# Patient Record
Sex: FEMALE | Race: BLACK OR AFRICAN AMERICAN | NOT HISPANIC OR LATINO | URBAN - METROPOLITAN AREA
[De-identification: names, ages, dates, MRNs, and addresses within clinical notes are randomized per-mention and may not be internally consistent; named-entity substitution may affect disease eponyms.]

---

## 2020-08-24 ENCOUNTER — INPATIENT (INPATIENT)
Facility: HOSPITAL | Age: 40
LOS: 0 days | Discharge: HOME | DRG: 812 | End: 2020-08-25
Attending: HOSPITALIST | Admitting: INTERNAL MEDICINE
Payer: COMMERCIAL

## 2020-08-24 VITALS
WEIGHT: 197.98 LBS | RESPIRATION RATE: 17 BRPM | HEART RATE: 87 BPM | OXYGEN SATURATION: 96 % | TEMPERATURE: 99 F | SYSTOLIC BLOOD PRESSURE: 122 MMHG | DIASTOLIC BLOOD PRESSURE: 87 MMHG

## 2020-08-24 DIAGNOSIS — Z98.890 OTHER SPECIFIED POSTPROCEDURAL STATES: Chronic | ICD-10-CM

## 2020-08-24 DIAGNOSIS — D47.3 ESSENTIAL (HEMORRHAGIC) THROMBOCYTHEMIA: ICD-10-CM

## 2020-08-24 DIAGNOSIS — R63.8 OTHER SYMPTOMS AND SIGNS CONCERNING FOOD AND FLUID INTAKE: ICD-10-CM

## 2020-08-24 DIAGNOSIS — D50.9 IRON DEFICIENCY ANEMIA, UNSPECIFIED: ICD-10-CM

## 2020-08-24 DIAGNOSIS — D25.9 LEIOMYOMA OF UTERUS, UNSPECIFIED: ICD-10-CM

## 2020-08-24 DIAGNOSIS — N83.201 UNSPECIFIED OVARIAN CYST, RIGHT SIDE: ICD-10-CM

## 2020-08-24 LAB
SARS-COV-2 RNA SPEC QL NAA+PROBE: SIGNIFICANT CHANGE UP
TRANSFERRIN SERPL-MCNC: 382 MG/DL — HIGH (ref 200–360)

## 2020-08-24 PROCEDURE — 76830 TRANSVAGINAL US NON-OB: CPT | Mod: 26

## 2020-08-24 PROCEDURE — 76856 US EXAM PELVIC COMPLETE: CPT | Mod: 26

## 2020-08-24 PROCEDURE — 99285 EMERGENCY DEPT VISIT HI MDM: CPT

## 2020-08-24 PROCEDURE — 99223 1ST HOSP IP/OBS HIGH 75: CPT | Mod: GC

## 2020-08-24 RX ORDER — LANOLIN ALCOHOL/MO/W.PET/CERES
5 CREAM (GRAM) TOPICAL AT BEDTIME
Refills: 0 | Status: DISCONTINUED | OUTPATIENT
Start: 2020-08-24 | End: 2020-08-25

## 2020-08-24 RX ADMIN — Medication 5 MILLIGRAM(S): at 23:31

## 2020-08-24 NOTE — H&P ADULT - HISTORY OF PRESENT ILLNESS
Patient is a 40yo female w/ PMH uterine fibroids who was sent to ED by hematologist Dr. Andrade after an office visit today that revealed Hb of 5 on labs. Patient saw gynecologist at [facility] office last week for her uterine fibroids with discussion of planning for a myomectomy. At this visit, she had blood drawn which showed Hb of 4 for which she was referred to see Dr. Andrade at an appointment earlier today with repeat labs done prior to being sent to ED. Patient reports at baseline she is physically active but for the past 4 years she has noticed herself feeling weaker and lightheaded and experiencing shortness of breath on exertion. Her menses have become heavier, particularly during the first 2 days of her cycle. Otherwise, patient has not noticed any other symptoms. No recent illnesses, no fevers, chills, CP, abdominal pain, N/V/D, or vaginal bleeding. Of note, she has not seen a PCP in at least 6 years.    ED Vitals: 97.8 degrees, 68bpm, 110/74, RR18, 100% RA  ED Labs: Hb 5.4, WBC 7.26, Plt 928, PT 13.4, INR 1.12, PTT 30.2  ED Imaging: Pelvic US pending  ED Course: T&S x2 done, given 1U pRBC. Seen by GynOnc who recommended TVUS but patient declined so pelvic US being done. No vaginal bleeding so GynOnc says nothing to do on their end but they will continue to follow. Patient is a 38yo female w/ PMH uterine fibroids who was sent to ED by hematologist Dr. Andrade after an office visit today that revealed Hb of 5 on labs. Patient saw gynecologist at [facility] office last week for her uterine fibroids with discussion of planning for a myomectomy. At this visit, she had blood drawn which showed Hb of 4 for which she was referred to see Dr. Andrade at an appointment earlier today with repeat labs done prior to being sent to ED. Patient reports at baseline she is physically active but for the past 4 years she has noticed herself feeling weaker and lightheaded and experiencing shortness of breath on exertion. Her menses have become heavier, particularly during the first 2 days of her cycle. Otherwise, patient has not noticed any other symptoms. No recent illnesses, no recent injuries/trauma, no fevers, chills, CP, abdominal pain, N/V/D, or vaginal bleeding. Of note, she has not seen a PCP in at least 6 years.    ED Vitals: 97.8 degrees, 68bpm, 110/74, RR18, 100% RA  ED Labs: Hb 5.4, WBC 7.26, Plt 928, PT 13.4, INR 1.12, PTT 30.2  ED Imaging: Pelvic US pending  ED Course: T&S x2 done, given 1U pRBC. Seen by GynOnc who recommended TVUS but patient declined so pelvic US being done. No vaginal bleeding so GynOnc says nothing to do on their end but they will continue to follow. Patient is a 38yo female w/ PMH uterine fibroids who was sent to ED by hematologist Dr. Andrade after an office visit today that revealed Hb of 5 on labs. Patient saw gynecologist at [facility] office last week for her uterine fibroids with discussion of planning for a myomectomy. At this visit, she had blood drawn which showed Hb of 4.9 for which she was referred to see Dr. Andrade at an appointment earlier today with repeat labs done prior to being sent to ED. Labs in Dr. Andrade's office were notable for Hb 5.1 and plt 1024. Patient reports at baseline she is physically active but for the past 4 years she has noticed herself feeling increasingly weaker and lightheaded and experiencing shortness of breath during her workouts. She subsequently decreased her time spent working out. Patient also reports the past 1 year her menses have become heavier, particularly during the first 2 days of her cycle. No increased days of bleeding or increased pain with menses though. Patient says her symptoms of weakness and lightheadedness significantly worsened after she got COVID19 in 3/2020. She recovered from COVID without any complications but since then, patient reports she has become increasingly lethargic and low energy during the day. She is unable to even walk her dog and can only manage to walk about 2 blocks before she starts feeling tired and needing to stop. Denies any syncope since onset of symptoms but reports she almost passed out 2 weeks ago at home. She started feeling dizzy and went to her bedroom to lay down and the symptoms resolved after a few minutes. Reports also developing intermittent throbbing headaches for the past 2 weeks associated with activity. Bending over will notably trigger the headache and she experiences them every day now. She tried taking ibuprofen a few times for the headaches but says this did not help at all. No recent illnesses, no recent injuries/trauma, no fevers, chills, CP, abdominal pain, N/V/D, or vaginal bleeding. Is pescetarian and reports hydrating well during the day. Takes B12, VitC, VitD, and Chelo supplements consistently at home. Of note, she has not seen a PCP in at least 6 years.    ED Vitals: 97.8 degrees, 68bpm, 110/74, RR18, 100% RA  ED Labs: Hb 5.4, WBC 7.26, Plt 928, PT 13.4, INR 1.12, PTT 30.2  ED Imaging: Pelvic US pending  ED Course: T&S x2 done, given 1U pRBC. Seen by GynOnc who recommended TVUS but patient declined so pelvic US being done. No vaginal bleeding so GynOnc says nothing to do on their end but they will continue to follow. Patient is a 40yo female w/ PMH uterine fibroids who was sent to ED by hematologist Dr. Andrade after an office visit today that revealed Hb of 5 on labs. Patient saw gynecologist at [facility] office last week for her uterine fibroids with discussion of planning for a myomectomy. At this visit, she had blood drawn which showed Hb of 4.9 for which she was referred to see Dr. Andrade at an appointment earlier today with repeat labs done prior to being sent to ED. Labs in Dr. Andrade's office were notable for Hb 5.1 and plt 1024. Patient reports at baseline she is physically active but for the past 4 years she has noticed herself feeling increasingly weaker and lightheaded and experiencing shortness of breath during her workouts. She subsequently decreased her time spent working out. Patient also reports the past 1 year her menses have become heavier, particularly during the first 2 days of her cycle. No increased days of bleeding or increased pain with menses though. Patient says her symptoms of weakness and lightheadedness significantly worsened after she got COVID19 in 3/2020. She recovered from COVID without any complications but since then, patient reports she has become increasingly lethargic and low energy during the day. She is unable to even walk her dog and can only manage to walk about 2 blocks before she starts feeling tired and needing to stop. Denies any syncope since onset of symptoms but reports she almost passed out 2 weeks ago at home. She started feeling dizzy and went to her bedroom to lay down and the symptoms resolved after a few minutes. Reports also developing intermittent throbbing headaches for the past 2 weeks associated with activity. Bending over will notably trigger the headache and she experiences them every day now. She tried taking ibuprofen a few times for the headaches but says this did not help at all. FH notable for sister also with hx of heavy periods and mother with prolonged menopausal bleeding. No recent illnesses, no recent injuries/trauma, no fevers, chills, CP, abdominal pain, N/V/D, or vaginal bleeding. Is pescetarian and reports hydrating well during the day. Takes B12, VitC, VitD, and Chelo supplements consistently at home. Of note, she has not seen a PCP in at least 6 years.    ED Vitals: 97.8 degrees, 68bpm, 110/74, RR18, 100% RA  ED Labs: Hb 5.4, WBC 7.26, Plt 928, PT 13.4, INR 1.12, PTT 30.2  ED Imaging: Pelvic US pending  ED Course: T&S x2 done, given 1U pRBC. Seen by GynOnc who recommended TVUS but patient declined so pelvic US being done. No vaginal bleeding so GynOnc says nothing to do on their end but they will continue to follow. Patient is a 40yo female w/ PMH uterine fibroids who was sent to ED by hematologist Dr. Andrade after an office visit today that revealed Hb of 5 on labs. Patient saw gynecologist at [facility] office last week for her uterine fibroids with discussion of planning for a myomectomy. At this visit, she had blood drawn which showed Hb of 4.9 for which she was referred to see Dr. Andrade at an appointment earlier today with repeat labs done prior to being sent to ED. Labs in Dr. Andrade's office were notable for Hb 5.1 and plt 1024. Patient reports at baseline she is physically active but for the past 4 years she has noticed herself feeling increasingly weaker and lightheaded and experiencing shortness of breath during her workouts. She subsequently decreased her time spent working out. Patient also reports the past 1 year her menses have become heavier, particularly during the first 2 days of her cycle. No increased days of bleeding or increased pain with menses though. Patient says her symptoms of weakness and lightheadedness significantly worsened after she got COVID19 in 3/2020. She recovered from COVID without any complications but since then, patient reports she has become increasingly lethargic and low energy during the day. She is unable to even walk her dog and can only manage to walk about 2 blocks before she starts feeling tired and needing to stop. Denies any syncope since onset of symptoms but reports she almost passed out 2 weeks ago at home. She started feeling dizzy and went to her bedroom to lay down and the symptoms resolved after a few minutes. Reports also developing intermittent throbbing headaches for the past 2 weeks associated with activity. Bending over will notably trigger the headache and she experiences them every day now. She tried taking ibuprofen a few times for the headaches but says this did not help at all. FH notable for sister also with hx of heavy periods and mother with prolonged menopausal bleeding. No recent illnesses, no recent injuries/trauma, no fevers, chills, CP, abdominal pain, N/V/D, or vaginal bleeding. Is pescetarian and reports hydrating well during the day. Takes B12, VitC, VitD, and Chelo supplements consistently at home. Of note, she has not seen a PCP in at least 6 years. LMP 8/18    ED Vitals: 97.8 degrees, 68bpm, 110/74, RR18, 100% RA  ED Labs: Hb 5.4, WBC 7.26, Plt 928, PT 13.4, INR 1.12, PTT 30.2  ED Imaging: Pelvic US pending  ED Course: T&S x2 done, given 1U pRBC. Seen by Gyn who recommended TVUS but patient declined so pelvic US being done. No vaginal bleeding so Gyn says nothing to do on their end but they will continue to follow.

## 2020-08-24 NOTE — CONSULT NOTE ADULT - ASSESSMENT
38yo  LMP  with hx of fibroids presents with symptomatic anemia with hemoglobin of 5.4. Recommend TVUS but Pt is refusing so will get a Pelvic sonogram. Vitals stable. Medicine team to see Pt. Pt is not currently having any vaginal bleeding so no intervention necessary at this time. Recommend 2u PRBC to start and repeat hemoglobin after transfusion is done.  GYN to continue to follow. Pt discussed with Dr. Curtis.

## 2020-08-24 NOTE — H&P ADULT - NSHPREVIEWOFSYSTEMS_GEN_ALL_CORE
REVIEW OF SYSTEMS:    CONSTITUTIONAL: No weakness, fevers or chills  EYES/ENT: No visual changes;  No vertigo or throat pain   NECK: No pain or stiffness  RESPIRATORY: No cough, wheezing, hemoptysis; No shortness of breath  CARDIOVASCULAR: No chest pain or palpitations  GASTROINTESTINAL: No abdominal or epigastric pain. No nausea, vomiting, or hematemesis; No diarrhea or constipation. No melena or hematochezia.  GENITOURINARY: No dysuria, frequency or hematuria  NEUROLOGICAL: No numbness or weakness  SKIN: No itching, burning, rashes, or lesions   All other review of systems is negative unless indicated above. REVIEW OF SYSTEMS:    CONSTITUTIONAL: increased weakness, lightheadedness, and lethargy, endorses throbbing headache, no fevers or chills  EYES/ENT: No visual changes;  No vertigo or throat pain   NECK: No pain or stiffness  RESPIRATORY: No cough, wheezing, hemoptysis; No shortness of breath  CARDIOVASCULAR: No chest pain or palpitations  GASTROINTESTINAL: No abdominal or epigastric pain. No nausea, vomiting, or hematemesis; No diarrhea or constipation. No melena or hematochezia.  GENITOURINARY: No dysuria, frequency or hematuria  NEUROLOGICAL: No numbness or tingling in extremitis  SKIN: No itching, burning, rashes, or lesions   All other review of systems is negative unless indicated above.

## 2020-08-24 NOTE — ED ADULT NURSE NOTE - OBJECTIVE STATEMENT
PT is a 39y female sent by MD for irregular labs. Pt states hx of fibroids, usually has heavy periods x 5 days, using about 7 pads per day. PT denies any pain, denies CP/SOB, pt states she does feel weak and sleepy. PT denies hx of blood transfusion, states is not bleeding now.

## 2020-08-24 NOTE — H&P ADULT - PROBLEM SELECTOR PLAN 2
- history of uterine fibroids  - f/u w/ outpatient gyn for planned myomectomy - history of uterine fibroids  - f/u w/ outpatient gyn for planned myomectomy  - gyn following - per US findings above  - f/u w/ gyn

## 2020-08-24 NOTE — H&P ADULT - NSHPSOCIALHISTORY_GEN_ALL_CORE
drinks alcohol socially, currently not sexually active Lives by herself at home with her dog. Denies hx of smoking and recreational drug use. Drinks alcohol socially. Currently not sexually active. Travels for work but has not done so for past several months since onset of pandemic. Was very physically active prior to onset of symptoms.

## 2020-08-24 NOTE — H&P ADULT - NSHPPHYSICALEXAM_GEN_ALL_CORE
.  VITAL SIGNS:  T(C): 36.7 (08-24-20 @ 16:54), Max: 37.1 (08-24-20 @ 11:34)  T(F): 98.1 (08-24-20 @ 16:54), Max: 98.7 (08-24-20 @ 11:34)  HR: 64 (08-24-20 @ 16:54) (63 - 87)  BP: 118/80 (08-24-20 @ 16:54) (105/71 - 124/78)  BP(mean): --  RR: 17 (08-24-20 @ 16:54) (17 - 20)  SpO2: 100% (08-24-20 @ 16:54) (96% - 100%)  Wt(kg): --    PHYSICAL EXAM:    Constitutional: WDWN resting comfortably in bed; NAD  Head: NC/AT  Eyes: PERRL, EOMI, clear conjunctiva  ENT: no nasal discharge; uvula midline, no oropharyngeal erythema or exudates; MMM  Neck: supple; no JVD or thyromegaly  Respiratory: CTA B/L; no W/R/R, no retractions  Cardiac: +S1/S2; RRR; no M/R/G; PMI non-displaced  Gastrointestinal: soft, NT/ND; no rebound or guarding; +BSx4  Genitourinary: normal external genitalia  Back: spine midline, no bony tenderness or step-offs; no CVAT B/L  Extremities: WWP, no clubbing or cyanosis; no peripheral edema  Musculoskeletal: NROM x4; no joint swelling, tenderness or erythema  Vascular: 2+ radial, femoral, DP/PT pulses B/L  Dermatologic: skin warm, dry and intact; no rashes, wounds, or scars  Lymphatic: no submandibular or cervical LAD  Neurologic: AAOx3; CNII-XII grossly intact; no focal deficits  Psychiatric: affect and characteristics of appearance, verbalizations, behaviors are appropriate .  VITAL SIGNS:  T(C): 36.7 (08-24-20 @ 16:54), Max: 37.1 (08-24-20 @ 11:34)  T(F): 98.1 (08-24-20 @ 16:54), Max: 98.7 (08-24-20 @ 11:34)  HR: 64 (08-24-20 @ 16:54) (63 - 87)  BP: 118/80 (08-24-20 @ 16:54) (105/71 - 124/78)  BP(mean): --  RR: 17 (08-24-20 @ 16:54) (17 - 20)  SpO2: 100% (08-24-20 @ 16:54) (96% - 100%)  Wt(kg): --    PHYSICAL EXAM:    Constitutional: WDWN resting comfortably in bed; NAD  Head: NC/AT  Eyes: PERRL, EOMI, clear conjunctiva, sclera nonicteric  ENT: no nasal discharge; uvula midline, no oropharyngeal erythema or exudates, mild paleness in tongue noted; MMM  Neck: supple; no JVD or thyromegaly  Respiratory: CTA B/L; no W/R/R, no retractions  Cardiac: +S1/S2; RRR; no M/R/G  Gastrointestinal: soft, NT/ND; no rebound or guarding; +BSx4  Extremities: WWP, no clubbing or cyanosis; no peripheral edema  Musculoskeletal: NROM x4; no joint swelling, tenderness or erythema  Vascular: 2+ radial and DP pulses B/L  Dermatologic: skin warm, dry and intact; no rashes, wounds, or scars  Lymphatic: no submandibular or cervical LAD  Neurologic: AAOx3; CNII-XII grossly intact; no focal deficits  Psychiatric: affect and characteristics of appearance, verbalizations, behaviors are appropriate

## 2020-08-24 NOTE — H&P ADULT - ASSESSMENT
38yo female w/ PMH uterine fibroids is admitted for low hemoglobin found in outpatient without any signs of bleeding. Now s/p 1U pRBC 38yo female w/ PMH uterine fibroids is admitted for low hemoglobin found in outpatient without any overt signs of bleeding. Now s/p 1U pRBC

## 2020-08-24 NOTE — ED ADULT NURSE NOTE - CHPI ED NUR SYMPTOMS NEG
no back pain/no fever/no pain/no discharge/no abdominal pain/no vomiting/no coffee grounds emesis/no nausea

## 2020-08-24 NOTE — CONSULT NOTE ADULT - SUBJECTIVE AND OBJECTIVE BOX
38yo  LMP  with hx of fibroids was sent from hematologist's office for hemoglobin of 5. Pt states she has not seen a doctor in 6 years and recently just saw a gynecologist last week.  Pt had blood drawn last week in GYN office at [facility] which revealed a hemoglobin of 4 and then was referred to see a hematologist, Dr. Andrade, today for anemia. Plan for fibroids was discuss with her GYN and they planned on a myomectomy. Pt is relatively an active person but in the last 4 years has been feeling weak and have been experiencing SOB with exertion. Pt's menses comes at the same time every month but bleeding has become heavier. She states she "gushes a lot of blood" in the first two days of her menses requiring the use of eight maxi pads and tampons. Pt does not desire to ever be on hormonal therapy. She is currently not sexually active.    Pt denies fever, chills, chest pain, SOB, abdominal pain, nausea, vomiting, or vaginal bleeding    OB/GYN Hx:    VTOP x2- manual vaccuum aspiration x1              -D&C x1   Last PAP smear: 6 years ago-normal   PMHx: denies   SHx: D&C x1   Meds: none   Allergies: NKDA     Social hx: recreational alcohol     PHYSICAL EXAM:   Vital Signs Last 24 Hrs  T(C): 36.6 (24 Aug 2020 13:36), Max: 37.1 (24 Aug 2020 11:34)  T(F): 97.8 (24 Aug 2020 13:36), Max: 98.7 (24 Aug 2020 11:34)  HR: 68 (24 Aug 2020 13:36) (68 - 87)  BP: 105/71 (24 Aug 2020 13:36) (105/71 - 122/87)  BP(mean): --  RR: 18 (24 Aug 2020 13:36) (17 - 18)  SpO2: 100% (24 Aug 2020 13:36) (96% - 100%)    **************************  Constitutional: Alert & Oriented x3, No acute distress  Respiratory: Clear to ausculation bilaterally; no wheezing, rhonchi, or crackles  Cardiovascular: regular rate and rhythm, no murmurs, or gallops  Gastrointestinal: soft, nontender, positive bowel sounds, no rebound or guarding   Extremities: no calf tenderness or swelling    LABS:                        5.4    7.26  )-----------( 928      ( 24 Aug 2020 12:16 )             20.5         136  |  102  |  11  ----------------------------<  96  4.4   |  23  |  0.76    Ca    9.0      24 Aug 2020 12:16    TPro  8.0  /  Alb  4.8  /  TBili  0.3  /  DBili  x   /  AST  13  /  ALT  7<L>  /  AlkPhos  46  08-24    PT/INR - ( 24 Aug 2020 12:16 )   PT: 13.4 sec;   INR: 1.12        PTT - ( 24 Aug 2020 12:16 )  PTT:30.2 sec      RADIOLOGY & ADDITIONAL STUDIES: 40yo  LMP  with hx of fibroids was sent from hematologist's office for hemoglobin of 5. Pt states she has not seen a doctor in 6 years and recently just saw a gynecologist last week.  Pt had blood drawn last week in GYN office at [facility] which revealed a hemoglobin of 4 and then was referred to see a hematologist, Dr. Andrade, today for anemia. Plan for fibroids was discuss with her GYN and they planned on a myomectomy. Pt is relatively an active person but in the last 4 years has been feeling weak, lightheaded and have been experiencing SOB with exertion. Pt's menses comes at the same time every month but bleeding has become heavier. She states she "gushes a lot of blood" in the first two days of her menses requiring the use of eight maxi pads and tampons. Pt does not desire to ever be on hormonal therapy. She is currently not sexually active.    Pt denies fever, chills, chest pain, abdominal pain, nausea, vomiting, or vaginal bleeding    OB/GYN Hx:    VTOP x2- manual vaccuum aspiration x1              -D&C x1   Last PAP smear: 6 years ago-normal   PMHx: denies   SHx: D&C x1   Meds: none   Allergies: NKDA     Social hx: recreational alcohol     PHYSICAL EXAM:   Vital Signs Last 24 Hrs  T(C): 36.6 (24 Aug 2020 13:36), Max: 37.1 (24 Aug 2020 11:34)  T(F): 97.8 (24 Aug 2020 13:36), Max: 98.7 (24 Aug 2020 11:34)  HR: 68 (24 Aug 2020 13:36) (68 - 87)  BP: 105/71 (24 Aug 2020 13:36) (105/71 - 122/87)  BP(mean): --  RR: 18 (24 Aug 2020 13:36) (17 - 18)  SpO2: 100% (24 Aug 2020 13:36) (96% - 100%)    **************************  Constitutional: Alert & Oriented x3, No acute distress  Respiratory: Clear to ausculation bilaterally; no wheezing, rhonchi, or crackles  Cardiovascular: regular rate and rhythm, no murmurs, or gallops  Gastrointestinal: soft, nontender, positive bowel sounds, no rebound or guarding   Extremities: no calf tenderness or swelling    LABS:                        5.4    7.26  )-----------( 928      ( 24 Aug 2020 12:16 )             20.5         136  |  102  |  11  ----------------------------<  96  4.4   |  23  |  0.76    Ca    9.0      24 Aug 2020 12:16    TPro  8.0  /  Alb  4.8  /  TBili  0.3  /  DBili  x   /  AST  13  /  ALT  7<L>  /  AlkPhos  46      PT/INR - ( 24 Aug 2020 12:16 )   PT: 13.4 sec;   INR: 1.12        PTT - ( 24 Aug 2020 12:16 )  PTT:30.2 sec      RADIOLOGY & ADDITIONAL STUDIES:

## 2020-08-24 NOTE — H&P ADULT - PROBLEM SELECTOR PLAN 1
- Hb of 4 last week in outptx gyn and then Hb 5 in Dr. Andrade's office earlier today  - On admission Hb 5.4  - no reported history of dx of anemia  - s/p 1U pRBC  - maintain active T&S (last done 8/24)  - f/u AM CBC  - f/u iron studies - most likely 2/2 hemorrhagic ovarian cyst found on pelvic US vs iron deficiency anemia  - pelvic US: Right ovarian hemorrhagic cyst measuring 7.4 cm  - has been having significantly increased mentstrual bleeding at beginning of menses but no vaginal bleeding outside of menses and no abdominal pain  - has been feeling lightheaded and low energy for past year, exacerbated by COVID  - Hb of 4 last week in outptx gyn and then Hb 5 in Dr. Andrade's office earlier today  - On admission Hb 5.4  - no reported history of dx of anemia  - s/p 1U pRBC, 2nd 1U of pRBC ordered  - maintain active T&S (last done 8/24)  - f/u AM CBC  - f/u iron studies  - gyn following - most likely 2/2 hemorrhagic ovarian cyst found on pelvic US vs uterine fibroids vs iron deficiency anemia  - transvaginal and pelvic US: Right ovarian hemorrhagic cyst measuring 7.4 cm  - has been having significantly increased menstrual bleeding at beginning of menses but no vaginal bleeding outside of menses and no abdominal pain  - has been feeling lightheaded and low energy for past year, exacerbated by COVID  - Hb of 4.9 last week in outptx gyn and then Hb 5.1 in Dr. Andrade's office earlier today  - On admission Hb 5.4  - no reported history of dx of anemia  - s/p 1U pRBC, 2nd 1U of pRBC ordered  - maintain active T&S (last done 8/24)  - f/u AM CBC  - f/u iron studies  - gyn following, f/u recs - most likely 2/2 uterine fibroids causing heavier menstrual bleeding VS hemorrhagic ovarian cyst found on pelvic US vs iron deficiency anemia  - transvaginal and pelvic US: Right ovarian hemorrhagic cyst measuring 7.4 cm  - has been having significantly increased menstrual bleeding at beginning of menses but no vaginal bleeding outside of menses and no abdominal pain  - has been feeling lightheaded and low energy for past year, exacerbated by COVID  - Hb of 4.9 last week in outptx gyn and then Hb 5.1 in Dr. Andrade's office earlier today  - On admission Hb 5.4  - no reported history of dx of anemia  - s/p 1U pRBC, 2nd 1U of pRBC ordered  - maintain active T&S (last done 8/24)  - f/u AM CBC  - f/u iron studies  - f/u B12, folate, and reticulocyte count  - gyn following, f/u recs

## 2020-08-24 NOTE — ED ADULT NURSE NOTE - NSIMPLEMENTINTERV_GEN_ALL_ED
Implemented All Universal Safety Interventions:  [place] to call system. Call bell, personal items and telephone within reach. Instruct patient to call for assistance. Room bathroom lighting operational. Non-slip footwear when patient is off stretcher. Physically safe environment: no spills, clutter or unnecessary equipment. Stretcher in lowest position, wheels locked, appropriate side rails in place.

## 2020-08-24 NOTE — H&P ADULT - NSHPLABSRESULTS_GEN_ALL_CORE
.  LABS:                         5.4    7.26  )-----------( 928      ( 24 Aug 2020 12:16 )             20.5     08-24    136  |  102  |  11  ----------------------------<  96  4.4   |  23  |  0.76    Ca    9.0      24 Aug 2020 12:16    TPro  8.0  /  Alb  4.8  /  TBili  0.3  /  DBili  x   /  AST  13  /  ALT  7<L>  /  AlkPhos  46  08-24    PT/INR - ( 24 Aug 2020 12:16 )   PT: 13.4 sec;   INR: 1.12       PTT - ( 24 Aug 2020 12:16 )  PTT:30.2 sec        RADIOLOGY, EKG & ADDITIONAL TESTS: Reviewed. .  LABS:                         5.4    7.26  )-----------( 928      ( 24 Aug 2020 12:16 )             20.5     08-24    136  |  102  |  11  ----------------------------<  96  4.4   |  23  |  0.76    Ca    9.0      24 Aug 2020 12:16    TPro  8.0  /  Alb  4.8  /  TBili  0.3  /  DBili  x   /  AST  13  /  ALT  7<L>  /  AlkPhos  46  08-24    PT/INR - ( 24 Aug 2020 12:16 )   PT: 13.4 sec;   INR: 1.12       PTT - ( 24 Aug 2020 12:16 )  PTT:30.2 sec        RADIOLOGY, EKG & ADDITIONAL TESTS: Reviewed.    < from: US Pelvis Complete (08.24.20 @ 18:44) >  IMPRESSION:  1.  Right ovarian hemorrhagic cyst measuring 7.4 cm. Follow-up examination in 6-12 weeks is recommended to ensure resolution.  2.  Multiple uterine fibroids measuring up to 10 cm.

## 2020-08-24 NOTE — H&P ADULT - ATTENDING COMMENTS
39F PMHx Fibroid Uterus presenting with significant anemia on outpatient labs and thrombocytosis, likely due to menorrhagia.     1. Anemia   - Hgb 5.4 - microcytic   - Likely due to menorrhagia vs GITA  - Iron Studies ordered - gold top not obtained in ED so these will be post 2u and therefore not reliable.     2. Thrombocytosis   - Reactive vs Autonomous   - In terms of reactive processes - can be related to blood loss or iron deficiency. As above, iron studies will not be helpful after transfusion.   - Patient denies family history of elevated platelet count or hematologic malignancy.   - Can obtain a smear to further evaluate platelet morphology   - Patient reported severe headaches 2 weeks ago, could be a symptom of thrombocytosis? At present, however, she is asymptomatic   - For f/u outpatient with Dr Andrade, who is aware   - Consider alerting fellow in AM of admission     3. Fibroid Uterus and Hemorrhagic Ovarian Cyst   - Gyn aware, s/p TVUS - confirming multiple large fibroids and incidental finding of hemorrhagic ovarian cyst (for conservative management and interval outpatient imaging in 6-12 weeks).   - Continue outpatient f/u - likely plan for myomectomy, patient to undergo pelvic MRI for pre-op workup.

## 2020-08-24 NOTE — ED PROVIDER NOTE - CLINICAL SUMMARY MEDICAL DECISION MAKING FREE TEXT BOX
39 y/o F here for anemia due to menstrual bleeding secondary to fibroids, here in ED Hgb 5.4, pt not actively bleeding. OBGYN consulted, recommends US and 2 units of blood transfusion. Contacted hospitalist Dr. Tam who agreed to admit but senior house officer was unable to be reached for almost half hour so confirmed w/ administration that it is okay to just give reports to the hospitalist. 37 y/o F here for anemia due to menstrual bleeding secondary to fibroids, here in ED Hgb 5.4, pt not actively bleeding. OBGYN consulted, recommends US and 2 units of blood transfusion. Contacted hospitalist Dr. Tam who agreed to admit but senior house officer was unable to be reached for almost half hour so confirmed w/ administration that it is okay to give report to hospitalist only.

## 2020-08-24 NOTE — H&P ADULT - PROBLEM SELECTOR PLAN 3
F - none  E - replete prn  N - regular diet  A -     Full Code  Dispo RMF - most likely reactive in setting of anemia and blood loss  - plt of 1024 in Dr. Andrade's office, repeat in ED was 928  - no family history of bleeding or hematological disorders noted  - f/u above labs in AM  - consult heme in AM (Dr. Andrade is pt's outpatient hematologist) - most likely reactive in setting of anemia and blood loss  - plt of 1024 in Dr. Andrade's office, repeat in ED was 928  - no family history of hematological disorders noted  - f/u above labs in AM  - consult heme in AM (Dr. Andrade is pt's outpatient hematologist)

## 2020-08-24 NOTE — H&P ADULT - PROBLEM SELECTOR PLAN 5
F - none  E - replete prn  N - regular diet  A -     Full Code  Dispo RMF F - none  E - replete prn  N - regular diet  A - none for now in setting of low Hb/possible bleed    Full Code  Dispo RMF

## 2020-08-24 NOTE — ED ADULT TRIAGE NOTE - CHIEF COMPLAINT QUOTE
c/o heavy vaginal bleeding 5-6 pads / day x few days, weakness, lightheadedness that started today.  Was told by her PMD that her Hgb is 5.3.  Hx fibroids

## 2020-08-24 NOTE — ED PROVIDER NOTE - OBJECTIVE STATEMENT
40 y/o F w/ PMHx fibroids, has heavy menstrual bleeding sts period typically lasts for x5 days and has to change pad every hour, last period last wk now presents to the ED sent by OBGYN for blood transfusion, did blood work which showed Hgb 4.8. Pt reports some dizziness when stands up and a little generalized weakness. No syncope. Not currently bleeding. No other complaints. Scheduled for outpatient myomectomy.

## 2020-08-24 NOTE — ED PROVIDER NOTE - NEURO NEGATIVE STATEMENT, MLM
no loss of consciousness, no gait abnormality, no headache, no sensory deficits, no weakness, +dizziness

## 2020-08-25 VITALS
OXYGEN SATURATION: 98 % | HEART RATE: 77 BPM | TEMPERATURE: 98 F | SYSTOLIC BLOOD PRESSURE: 121 MMHG | DIASTOLIC BLOOD PRESSURE: 70 MMHG | RESPIRATION RATE: 18 BRPM

## 2020-08-25 LAB
FERRITIN SERPL-MCNC: 8 NG/ML — LOW (ref 15–150)
FOLATE SERPL-MCNC: 15.7 NG/ML — SIGNIFICANT CHANGE UP
HCT VFR BLD CALC: 26.6 % — LOW (ref 34.5–45)
HGB BLD-MCNC: 7.6 G/DL — LOW (ref 11.5–15.5)
IRON SATN MFR SERPL: 20 UG/DL — LOW (ref 30–160)
IRON SATN MFR SERPL: 4 % — LOW (ref 14–50)
MCHC RBC-ENTMCNC: 21.3 PG — LOW (ref 27–34)
MCHC RBC-ENTMCNC: 28.6 GM/DL — LOW (ref 32–36)
MCV RBC AUTO: 74.5 FL — LOW (ref 80–100)
NRBC # BLD: 0 /100 WBCS — SIGNIFICANT CHANGE UP (ref 0–0)
PLATELET # BLD AUTO: 997 K/UL — HIGH (ref 150–400)
RBC # BLD: 3.57 M/UL — LOW (ref 3.8–5.2)
RBC # BLD: 3.57 M/UL — LOW (ref 3.8–5.2)
RBC # FLD: 23.9 % — HIGH (ref 10.3–14.5)
RETICS #: 17.3 K/UL — LOW (ref 25–125)
RETICS/RBC NFR: 0.5 % — SIGNIFICANT CHANGE UP (ref 0.5–2.5)
SARS-COV-2 IGG SERPL QL IA: NEGATIVE — SIGNIFICANT CHANGE UP
SARS-COV-2 IGM SERPL IA-ACNC: <0.1 INDEX — SIGNIFICANT CHANGE UP
TIBC SERPL-MCNC: 446 UG/DL — HIGH (ref 220–430)
TRANSFERRIN SERPL-MCNC: 362 MG/DL — HIGH (ref 200–360)
UIBC SERPL-MCNC: 426 UG/DL — HIGH (ref 110–370)
VIT B12 SERPL-MCNC: 655 PG/ML — SIGNIFICANT CHANGE UP (ref 232–1245)
WBC # BLD: 8.74 K/UL — SIGNIFICANT CHANGE UP (ref 3.8–10.5)
WBC # FLD AUTO: 8.74 K/UL — SIGNIFICANT CHANGE UP (ref 3.8–10.5)

## 2020-08-25 PROCEDURE — 85025 COMPLETE CBC W/AUTO DIFF WBC: CPT

## 2020-08-25 PROCEDURE — 99239 HOSP IP/OBS DSCHRG MGMT >30: CPT | Mod: GC

## 2020-08-25 PROCEDURE — P9016: CPT

## 2020-08-25 PROCEDURE — 83550 IRON BINDING TEST: CPT

## 2020-08-25 PROCEDURE — 86901 BLOOD TYPING SEROLOGIC RH(D): CPT

## 2020-08-25 PROCEDURE — 36415 COLL VENOUS BLD VENIPUNCTURE: CPT

## 2020-08-25 PROCEDURE — 85610 PROTHROMBIN TIME: CPT

## 2020-08-25 PROCEDURE — 82607 VITAMIN B-12: CPT

## 2020-08-25 PROCEDURE — 76830 TRANSVAGINAL US NON-OB: CPT

## 2020-08-25 PROCEDURE — 85045 AUTOMATED RETICULOCYTE COUNT: CPT

## 2020-08-25 PROCEDURE — 82728 ASSAY OF FERRITIN: CPT

## 2020-08-25 PROCEDURE — 99285 EMERGENCY DEPT VISIT HI MDM: CPT

## 2020-08-25 PROCEDURE — 86850 RBC ANTIBODY SCREEN: CPT

## 2020-08-25 PROCEDURE — 80053 COMPREHEN METABOLIC PANEL: CPT

## 2020-08-25 PROCEDURE — 83540 ASSAY OF IRON: CPT

## 2020-08-25 PROCEDURE — 86769 SARS-COV-2 COVID-19 ANTIBODY: CPT

## 2020-08-25 PROCEDURE — 76856 US EXAM PELVIC COMPLETE: CPT

## 2020-08-25 PROCEDURE — 87635 SARS-COV-2 COVID-19 AMP PRB: CPT

## 2020-08-25 PROCEDURE — 85730 THROMBOPLASTIN TIME PARTIAL: CPT

## 2020-08-25 PROCEDURE — 82746 ASSAY OF FOLIC ACID SERUM: CPT

## 2020-08-25 PROCEDURE — 84466 ASSAY OF TRANSFERRIN: CPT

## 2020-08-25 PROCEDURE — 85027 COMPLETE CBC AUTOMATED: CPT

## 2020-08-25 PROCEDURE — 36430 TRANSFUSION BLD/BLD COMPNT: CPT

## 2020-08-25 PROCEDURE — 86923 COMPATIBILITY TEST ELECTRIC: CPT

## 2020-08-25 RX ORDER — IRON SUCROSE 20 MG/ML
200 INJECTION, SOLUTION INTRAVENOUS ONCE
Refills: 0 | Status: COMPLETED | OUTPATIENT
Start: 2020-08-25 | End: 2020-08-25

## 2020-08-25 RX ORDER — FERROUS SULFATE 325(65) MG
1 TABLET ORAL
Qty: 0 | Refills: 0 | DISCHARGE
Start: 2020-08-25

## 2020-08-25 RX ORDER — FERROUS SULFATE 325(65) MG
325 TABLET ORAL DAILY
Refills: 0 | Status: DISCONTINUED | OUTPATIENT
Start: 2020-08-25 | End: 2020-08-25

## 2020-08-25 RX ORDER — FERROUS SULFATE 325(65) MG
1 TABLET ORAL
Qty: 30 | Refills: 0
Start: 2020-08-25 | End: 2020-09-23

## 2020-08-25 RX ADMIN — Medication 325 MILLIGRAM(S): at 13:57

## 2020-08-25 RX ADMIN — IRON SUCROSE 110 MILLIGRAM(S): 20 INJECTION, SOLUTION INTRAVENOUS at 10:09

## 2020-08-25 NOTE — DISCHARGE NOTE PROVIDER - HOSPITAL COURSE
#Discharge: do not delete        Patient is 38 yo F with past medical history of uterine fibroids.    Presented with low hemoglobin, found to have iron deficiency anemia.        Problem List/Main Diagnoses (system-based):         1) Microcytic anemia    - most likely 2/2 uterine fibroids causing heavier menstrual bleeding VS hemorrhagic ovarian cyst found on pelvic US vs iron deficiency anemia    - transvaginal and pelvic US: Right ovarian hemorrhagic cyst measuring 7.4 cm    - has been having significantly increased menstrual bleeding at beginning of menses but no vaginal bleeding outside of menses and no abdominal pain    - has been feeling lightheaded and low energy for past year, exacerbated by COVID    - Hb of 4.9 last week in outptx gyn and then Hb 5.1 in Dr. Andrade's office earlier today    - On admission Hb 5.4    - no reported history of dx of anemia    - s/p 2Uof pRBC ordered    - decreased retic count, iron studies consistent with iron deficiency anemia    - B12 and folate wnl    - started on ferrous sulfate, received IV iron transfusion            2) Ovarian cyst, right - per US findings above, will have repeat imaging done outpatient        3) Thrombocytosis    -  most likely reactive in setting of anemia and blood loss    - plt of 1024 in Dr. Andrade's office, repeat in ED was 928    - no family history of hematological disorders noted    - will be following up outpatient        4) Leiomyoma of uterus    - history of uterine fibroids    - f/u w/ outpatient gyn for planned myomectomy                    Inpatient treatment course: Received 2U pRBC. Received iron transfusion.    New medications: Ferrous sulfate    Labs to be followed outpatient: hemoglobin, platelets    Exam to be followed outpatient: n/a #Discharge: do not delete        Patient is 40 yo F with past medical history of uterine fibroids.    Presented with low hemoglobin, found to have iron deficiency anemia.        Problem List/Main Diagnoses (system-based):         1) Microcytic anemia    - most likely 2/2 uterine fibroids causing heavier menstrual bleeding VS hemorrhagic ovarian cyst found on pelvic US vs iron deficiency anemia    - transvaginal and pelvic US: Right ovarian hemorrhagic cyst measuring 7.4 cm    - has been having significantly increased menstrual bleeding at beginning of menses but no vaginal bleeding outside of menses and no abdominal pain    - has been feeling lightheaded and low energy for past year, exacerbated by COVID    - Hb of 4.9 last week in outptx gyn and then Hb 5.1 in Dr. Andrade's office earlier today    - On admission Hb 5.4    - no reported history of dx of anemia    - s/p 2Uof pRBC ordered    - decreased retic count, iron studies consistent with iron deficiency anemia    - B12 and folate wnl    - started on ferrous sulfate, received IV iron transfusion            2) Ovarian cyst, right - per US findings above, will have repeat imaging done outpatient        3) Thrombocytosis    -  most likely reactive in setting of anemia and blood loss    - plt of 1024 in Dr. Andrade's office, repeat in ED was 928    - no family history of hematological disorders noted    - will be following up outpatient        4) Leiomyoma of uterus    - history of uterine fibroids    - f/u w/ outpatient gyn for planned myomectomy            Inpatient treatment course: Received 2U pRBC. Received iron transfusion.    New medications: Ferrous sulfate    Labs to be followed outpatient: hemoglobin, platelets    Exam to be followed outpatient: n/a

## 2020-08-25 NOTE — DISCHARGE NOTE PROVIDER - NSDCCPCAREPLAN_GEN_ALL_CORE_FT
PRINCIPAL DISCHARGE DIAGNOSIS  Diagnosis: Anemia  Assessment and Plan of Treatment: You have anemia. This is most likely due to iron deficiency and your heavy menstrual bleeding. In general, anemia is the term doctors and nurses use when a person has too few red blood cells. Red blood cells are the cells in your blood that carry oxygen. If you have too few red blood cells, your body might not get all the oxygen it needs.  Anemia can happen for a few reasons. A common reason is a lack of iron. This is called "iron deficiency anemia." You can have too little iron because:  1) You have lost a large amount of blood – This can happen slowly over time, or all of a sudden. It is the most common cause of iron deficiency anemia.  2) Your body cannot absorb enough iron from food – This can happen if you have had surgery on your stomach or intestines.  3) You do not get enough iron in your food – This can be a problem in infants who drink milk without iron.  Whatever the cause of your anemia, your doctor or nurse can treat it by giving you iron. If the anemia is severe, you might need a blood transfusion. You might also need treatment for the cause of the bleeding.  People with iron deficiency anemia need to get iron. Eating foods with iron will not do enough to cure the anemia. You can get extra iron in pills or through a thin tube that goes into a vein, called an "IV." Most people get it in pills. Your doctor or nurse will tell you how much to take, and for how long.  Iron pills can cause side effects such as upset stomach and constipation (too few bowel movements). If you have side effects, ask your doctor or nurse what to do. They can suggest ways to reduce these side effects, or switch you to IV iron.  Please make sure to follow-up with Dr. Andrade.

## 2020-08-25 NOTE — PROGRESS NOTE ADULT - SUBJECTIVE AND OBJECTIVE BOX
Patient evaluated at bedside, says she is feeling much better now after the 2u PRBCs. Continues to deny any vaginal bleeding. Also denies HA, dizziness, SOB, CP, palpitations, n/v.    T(C): 36.5 (08-25-20 @ 06:19), Max: 36.7 (08-24-20 @ 20:49)  HR: 73 (08-25-20 @ 06:19) (72 - 73)  BP: 113/71 (08-25-20 @ 06:19) (113/71 - 116/75)  RR: 18 (08-25-20 @ 06:19) (18 - 19)  SpO2: 99% (08-25-20 @ 06:19) (99% - 99%)    GA: NAD  Resp: normal respiratory effort  Abd: +BS, soft, NT/ND, no rebound or guarding  Extrem: SCDs in place, no LE edema                               7.6    8.74  )-----------( 997      ( 25 Aug 2020 06:53 )             26.6     08-24    136  |  102  |  11  ----------------------------<  96  4.4   |  23  |  0.76    Ca    9.0      24 Aug 2020 12:16    TPro  8.0  /  Alb  4.8  /  TBili  0.3  /  DBili  x   /  AST  13  /  ALT  7<L>  /  AlkPhos  46  08-24      MEDICATIONS  (STANDING):  melatonin 5 milliGRAM(s) Oral at bedtime    MEDICATIONS  (PRN):      EXAM:  US TRANSVAGINAL                          EXAM:  US PELVIC COMPLETE                          PROCEDURE DATE:  08/24/2020          INTERPRETATION:  PELVIC ULTRASOUND dated 8/24/2020 6:44 PM    INDICATION: Anemia, fibroids, pain.  AGE: 39 Beta-hCG: Not available LMP: 8/13/2020.    TECHNIQUE: Transabdominal views of the pelvis were obtained followed by transvaginal views for better visualization of the ovaries.    PRIOR STUDIES: None    FINDINGS:  These images demonstrate the uterus to be anteverted. The uterus is markedly enlarged, measuring 17.2 x 9.1 x 13.3 cm. Anterior fundal subserosal fibroid measuring 9.9 x 6.4 x 6.4 cm and posterior fundal subserosal fibroid measuring 7.0 x 7.3 x 6.5 cm are seen. The endometrium is 0.6 cm in thickness, which is normal.    The right ovary is enlarged, measuring 9.0 x 6.7 x 8.3 cm with a calculated volume of 258 mL. A right ovarian cyst measuring 7.4 x 6.0 x 6.1 cm with low level echoes and mesh like internal echoes likely represents a hemorrhagic ovarian cyst. The left ovary is normal in size, measuring 4.3 x 2.1 x 3.5 cm with a calculated volume of 16 mL. No left ovarian masses are seen. Doppler evaluation demonstrates flow to both ovaries with no evidence of torsion.    A physiologic amount of free fluid is seen in the cul-de-sac.      IMPRESSION:    1.  Large right complex ovarian cyst which may be hemorrhagic and measuring 7.4 cm. Follow-up examination in 6-12 weeks is recommended to ensure resolution. No evidence of torsion at this time. Intermittent torsion in the proper clinical setting cannot be excluded.  2.  Multiple large subserosal fundal uterine fibroids measuring up to 10 cm.          Thank you for the opportunity to participate in the care of this patient.    KATYA GAITAN M.D., RADIOLOGY RESIDENT  This document has been electronically signed.  JANI CHILDERS M.D., ATTENDING RADIOLOGIST  This document has been electronically signed. Aug 24 2020  9:40PM

## 2020-08-25 NOTE — DISCHARGE NOTE PROVIDER - NSDCFUADDAPPT_GEN_ALL_CORE_FT
Please follow-up with Dr. Briana Andrade 9/1/2020 @ 10:00am .    Please be sure to make and attend your appointment with you primary care provider.

## 2020-08-25 NOTE — DISCHARGE NOTE NURSING/CASE MANAGEMENT/SOCIAL WORK - PATIENT PORTAL LINK FT
You can access the FollowMyHealth Patient Portal offered by [facility] by registering at the following website: [email or web address]. By joining [employer]’s FollowMyHealth portal, you will also be able to view your health information using other applications (apps) compatible with our system.

## 2020-08-25 NOTE — PROGRESS NOTE ADULT - ASSESSMENT
A/P: 38yo  LMP  with known h/o fibroids presented with symptomatic anemia with hemoglobin of 5.4.  - VSS, Hb 7.6 this AM which is an appropriate rise after 2u PRBCs and patient feeling better  - TVUS/TAUS performed yesterday  showed large R complex ovarian cyst which may be hemorrhagic measuring 7.4cm and multiple large subserosal fibroids measuring up to 10cm  - Patient has been followed closely by her ObGyn at an OSH for fibroids and symptomatic anemia and plan is for abdominal myomectomy. Pre-op MRI is scheduled for  and patient to have f/u appointment to discuss results and management plan. Patient also should have f/u imaging for 7cm hemorrhagic cyst. Discussed theoretical risk of ovarian torsion though no concern at this time given patient denies pelvic pain. Recommend pelvic rest.  - Recommend PO iron  - Given patient continues to deny any vaginal bleeding, no acute gyn intervention indicated at this time. Gyn to sign off. Please do not hesitate to reconsult for any questions/concerns.    d/w Dr Renae (on call attending)

## 2020-08-28 DIAGNOSIS — N83.201 UNSPECIFIED OVARIAN CYST, RIGHT SIDE: ICD-10-CM

## 2020-08-28 DIAGNOSIS — N92.0 EXCESSIVE AND FREQUENT MENSTRUATION WITH REGULAR CYCLE: ICD-10-CM

## 2020-08-28 DIAGNOSIS — D47.3 ESSENTIAL (HEMORRHAGIC) THROMBOCYTHEMIA: ICD-10-CM

## 2020-08-28 DIAGNOSIS — D50.9 IRON DEFICIENCY ANEMIA, UNSPECIFIED: ICD-10-CM

## 2020-08-28 DIAGNOSIS — D25.9 LEIOMYOMA OF UTERUS, UNSPECIFIED: ICD-10-CM

## 2022-03-11 NOTE — ED ADULT NURSE NOTE - DOES PATIENT HAVE ADVANCE DIRECTIVE
Patient calling in regards to having a reaction to the estradiol cream and would like a call back to see what your opinion would be  Patient is stating she has a headache and low grade fever  Patient wanted to be safe and took an at home covid test and it was negative  Patient states she has no felt right since starting this cream  Patient is available al day  Please advise  No